# Patient Record
Sex: MALE | Race: WHITE | NOT HISPANIC OR LATINO | Employment: FULL TIME | URBAN - NONMETROPOLITAN AREA
[De-identification: names, ages, dates, MRNs, and addresses within clinical notes are randomized per-mention and may not be internally consistent; named-entity substitution may affect disease eponyms.]

---

## 2024-02-07 ENCOUNTER — TELEPHONE (OUTPATIENT)
Dept: CARDIOLOGY | Facility: CLINIC | Age: 49
End: 2024-02-07
Payer: COMMERCIAL

## 2024-02-07 NOTE — TELEPHONE ENCOUNTER
EKG at work for basic job information came back showing abnormal. So they made OV with our office. Wife called into office states that it reads    Left axis deviation  Septal infarct    Advised will review at his pending visit and do another to compare. Scheduled on 2/21/2024. Did advise the computer interpretation is not always correct so will address at visit

## 2024-02-21 ENCOUNTER — APPOINTMENT (OUTPATIENT)
Dept: CARDIOLOGY | Facility: CLINIC | Age: 49
End: 2024-02-21
Payer: COMMERCIAL

## 2024-03-01 ENCOUNTER — OFFICE VISIT (OUTPATIENT)
Dept: CARDIOLOGY | Facility: CLINIC | Age: 49
End: 2024-03-01
Payer: COMMERCIAL

## 2024-03-01 VITALS
WEIGHT: 164 LBS | HEIGHT: 67 IN | HEART RATE: 44 BPM | DIASTOLIC BLOOD PRESSURE: 76 MMHG | SYSTOLIC BLOOD PRESSURE: 120 MMHG | BODY MASS INDEX: 25.74 KG/M2

## 2024-03-01 DIAGNOSIS — R94.31 ABNORMAL EKG: ICD-10-CM

## 2024-03-01 DIAGNOSIS — R56.9 SEIZURE (MULTI): ICD-10-CM

## 2024-03-01 DIAGNOSIS — R00.1 BRADYCARDIA: ICD-10-CM

## 2024-03-01 DIAGNOSIS — F17.200 CURRENT SMOKER: Primary | ICD-10-CM

## 2024-03-01 DIAGNOSIS — E78.2 MIXED HYPERLIPIDEMIA: ICD-10-CM

## 2024-03-01 PROBLEM — E78.5 HYPERLIPIDEMIA: Status: ACTIVE | Noted: 2024-03-01

## 2024-03-01 PROCEDURE — 3008F BODY MASS INDEX DOCD: CPT | Performed by: INTERNAL MEDICINE

## 2024-03-01 PROCEDURE — 93000 ELECTROCARDIOGRAM COMPLETE: CPT | Performed by: INTERNAL MEDICINE

## 2024-03-01 PROCEDURE — 99204 OFFICE O/P NEW MOD 45 MIN: CPT | Performed by: INTERNAL MEDICINE

## 2024-03-01 RX ORDER — TIZANIDINE 4 MG/1
4 TABLET ORAL EVERY 6 HOURS PRN
COMMUNITY

## 2024-03-01 RX ORDER — TRAZODONE HYDROCHLORIDE 150 MG/1
300 TABLET ORAL NIGHTLY
COMMUNITY

## 2024-03-01 RX ORDER — GABAPENTIN 300 MG/1
300 CAPSULE ORAL 2 TIMES DAILY
COMMUNITY

## 2024-03-01 RX ORDER — DICLOFENAC SODIUM 100 MG/1
100 TABLET, EXTENDED RELEASE ORAL DAILY
COMMUNITY

## 2024-03-01 RX ORDER — PHENOBARBITAL 64.8 MG/1
64.8 TABLET ORAL 2 TIMES DAILY
COMMUNITY

## 2024-03-01 RX ORDER — SIMVASTATIN 5 MG/1
5 TABLET, FILM COATED ORAL NIGHTLY
COMMUNITY
Start: 2024-02-16

## 2024-03-01 RX ORDER — ACETAMINOPHEN 500 MG
500 TABLET ORAL 2 TIMES DAILY
COMMUNITY

## 2024-03-01 RX ORDER — TOPIRAMATE 50 MG/1
50 TABLET, FILM COATED ORAL DAILY
COMMUNITY

## 2024-03-01 NOTE — LETTER
"March 1, 2024     Maynor Kathleen  [address] [place] [id number]    Patient: Maynor Kathleen   YOB: 1975   Date of Visit: 3/1/2024       Dear Dr. Maynor Kathleen:    Thank you for referring Maynor Kathleen to me for evaluation. Below are my notes for this consultation.  If you have questions, please do not hesitate to call me. I look forward to following your patient along with you.       Sincerely,     Nelli Stein MD      CC: No Recipients  ______________________________________________________________________________________    Cardiology Consultation- New Consult    Reason for referral: Abnormal EKG    HPI: Maynor Kathleen is a 48 y.o. male with no prior cardiac history.  He self scheduled this appointment because of abnormal \"cardiac scan\".  The patient apparently had an EKG at work.  He was told it was abnormal.  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  He denies any complaint of chest pain, palpitation, lightheadedness, dizziness or syncope.  His EKG showed sinus bradycardia with incomplete right bundle branch block.  Some features of Brugada syndrome are seen in lead V1 and V2.  Patient denies any lightheadedness, dizziness or syncope.  There is no family history of sudden cardiac death.  He report history of seizure and he is on phenobarbital.    Assessment    1.  Excessive resting sinus bradycardia with heart rate of 46  2.  Borderline abnormal EKG showing some features of Brugada syndrome however the patient has no palpitation, no lightheadedness, no dizziness, no syncope and no family history of sudden cardiac death  3.  Chronic back pain  4.  History of seizure control none over the last 20 years  5.  Tobacco use    Plan    1.  I recommended the patient to have a 40-hour Holter monitor  2.  I recommend checking TSH  3.  I recommended GXT to confirm normal chronotropic response to exercise and no provoked arrhythmia.  4.  Lengthy discussion " "with the family regarding prognosis, diagnosis and workup option reviewed with the patient his son and his wife  5.  Patient was counseled regarding smoking cessation      Past Medical History:   He has no past medical history on file.    Surgical History:   He has a past surgical history that includes Colonoscopy; Hand surgery; and Vasectomy.    Family History:   Family History   Problem Relation Name Age of Onset   • No Known Problems Mother     • Clotting disorder Father     • No Known Problems Sister     • No Known Problems Brother         Social History:   Social History     Tobacco Use   • Smoking status: Every Day     Types: Cigarettes   • Smokeless tobacco: Current   Substance Use Topics   • Alcohol use: Yes        Allergies:  Beeswax     Current Medications:    Current Outpatient Medications:   •  acetaminophen (Tylenol) 500 mg tablet, Take 1 tablet (500 mg) by mouth 2 times a day., Disp: , Rfl:   •  diclofenac sodium (Voltaren XR) 100 mg 24 hr tablet, Take 1 tablet (100 mg) by mouth once daily. Take with food., Disp: , Rfl:   •  gabapentin (Neurontin) 300 mg capsule, Take 1 capsule (300 mg) by mouth 2 times a day., Disp: , Rfl:   •  PHENobarbitaL 64.8 mg tablet, Take 1 tablet (64.8 mg) by mouth at 3:00 in the morning., Disp: , Rfl:   •  simvastatin (Zocor) 5 mg tablet, Take 1 tablet (5 mg) by mouth once daily at bedtime., Disp: , Rfl:   •  tiZANidine (Zanaflex) 4 mg tablet, Take 1 tablet (4 mg) by mouth every 6 hours if needed., Disp: , Rfl:   •  topiramate (Topamax) 50 mg tablet, Take 1 tablet (50 mg) by mouth once daily., Disp: , Rfl:   •  traZODone (Desyrel) 150 mg tablet, Take 2 tablets (300 mg) by mouth once daily at bedtime., Disp: , Rfl:      Vitals:  Vitals:    03/01/24 1006 03/01/24 1007   BP: 128/78 120/76   BP Location: Right arm Left arm   Patient Position: Sitting Sitting   Pulse: (!) 44 (!) 44   Weight: 74.4 kg (164 lb)    Height: 1.702 m (5' 7\")           ROS    Objective        Physical " Exam  Constitutional:       Appearance: Normal appearance.   HENT:      Nose: Nose normal.   Neck:      Vascular: No carotid bruit.   Cardiovascular:      Rate and Rhythm: Normal rate.      Pulses: Normal pulses.      Heart sounds: Normal heart sounds.   Pulmonary:      Effort: Pulmonary effort is normal.   Abdominal:      General: Bowel sounds are normal.      Palpations: Abdomen is soft.   Musculoskeletal:         General: Normal range of motion.      Cervical back: Normal range of motion.      Right lower leg: No edema.      Left lower leg: No edema.   Skin:     General: Skin is warm and dry.   Neurological:      General: No focal deficit present.      Mental Status: He is alert.   Psychiatric:         Mood and Affect: Mood normal.         Behavior: Behavior normal.         Thought Content: Thought content normal.         Judgment: Judgment normal.              EKG done in office today   Assessment and Plan:   1. Current smoker        2. Abnormal EKG  ECG 12 Lead    Stress Test    Thyroid Stimulating Hormone    Thyroid Stimulating Hormone      3. Bradycardia  Follow Up In Cardiology    ECG 12 Lead    Stress Test    Holter Or Event Cardiac Monitor    Thyroid Stimulating Hormone    Thyroid Stimulating Hormone      4. Mixed hyperlipidemia        5. BMI 25.0-25.9,adult        6. Seizure (CMS/HCC)               Scribe Attestation  By signing my name below, Imarticlaraceli , Scribe   attest that this documentation has been prepared under the direction and in the presence of Nelli Stein MD.    Provider Attestation - Scribe documentation    All medical record entries made by the Scribe were at my direction and personally dictated by me. I have reviewed the chart and agree that the record accurately reflects my personal performance of the history, physical exam, discussion and plan.

## 2024-03-01 NOTE — PATIENT INSTRUCTIONS
Please bring all medicines, vitamins, and herbal supplements with you when you come to the office.    Prescriptions will not be filled unless you are compliant with your follow up appointments or have a follow up appointment scheduled as per instruction of your physician. Refills should be requested at the time of your visit.     BMI was above normal measurement. Current weight: 74.4 kg (164 lb)  Weight change since last visit (-) denotes wt loss 164 lbs   Weight loss needed to achieve BMI 25: 4.7 Lbs  Weight loss needed to achieve BMI 30: -27.1 Lbs  Provided instructions on dietary changes  Provided instructions on exercise.    48 hour holter  GXT  TSH

## 2024-03-01 NOTE — PROGRESS NOTES
"Cardiology Consultation- New Consult    Reason for referral: Abnormal EKG    HPI: Maynor Kathleen is a 48 y.o. male with no prior cardiac history.  He self scheduled this appointment because of abnormal \"cardiac scan\".  The patient apparently had an EKG at work.  He was told it was abnormal.  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  He denies any complaint of chest pain, palpitation, lightheadedness, dizziness or syncope.  His EKG showed sinus bradycardia with incomplete right bundle branch block.  Some features of Brugada syndrome are seen in lead V1 and V2.  Patient denies any lightheadedness, dizziness or syncope.  There is no family history of sudden cardiac death.  He report history of seizure and he is on phenobarbital.    Assessment    1.  Excessive resting sinus bradycardia with heart rate of 46  2.  Borderline abnormal EKG showing some features of Brugada syndrome however the patient has no palpitation, no lightheadedness, no dizziness, no syncope and no family history of sudden cardiac death  3.  Chronic back pain  4.  History of seizure control none over the last 20 years  5.  Tobacco use    Plan    1.  I recommended the patient to have a 40-hour Holter monitor  2.  I recommend checking TSH  3.  I recommended GXT to confirm normal chronotropic response to exercise and no provoked arrhythmia.  4.  Lengthy discussion with the family regarding prognosis, diagnosis and workup option reviewed with the patient his son and his wife  5.  Patient was counseled regarding smoking cessation      Past Medical History:   He has no past medical history on file.    Surgical History:   He has a past surgical history that includes Colonoscopy; Hand surgery; and Vasectomy.    Family History:   Family History   Problem Relation Name Age of Onset    No Known Problems Mother      Clotting disorder Father      No Known Problems Sister      No Known Problems Brother         Social History:   Social " "History     Tobacco Use    Smoking status: Every Day     Types: Cigarettes    Smokeless tobacco: Current   Substance Use Topics    Alcohol use: Yes        Allergies:  Beeswax     Current Medications:    Current Outpatient Medications:     acetaminophen (Tylenol) 500 mg tablet, Take 1 tablet (500 mg) by mouth 2 times a day., Disp: , Rfl:     diclofenac sodium (Voltaren XR) 100 mg 24 hr tablet, Take 1 tablet (100 mg) by mouth once daily. Take with food., Disp: , Rfl:     gabapentin (Neurontin) 300 mg capsule, Take 1 capsule (300 mg) by mouth 2 times a day., Disp: , Rfl:     PHENobarbitaL 64.8 mg tablet, Take 1 tablet (64.8 mg) by mouth at 3:00 in the morning., Disp: , Rfl:     simvastatin (Zocor) 5 mg tablet, Take 1 tablet (5 mg) by mouth once daily at bedtime., Disp: , Rfl:     tiZANidine (Zanaflex) 4 mg tablet, Take 1 tablet (4 mg) by mouth every 6 hours if needed., Disp: , Rfl:     topiramate (Topamax) 50 mg tablet, Take 1 tablet (50 mg) by mouth once daily., Disp: , Rfl:     traZODone (Desyrel) 150 mg tablet, Take 2 tablets (300 mg) by mouth once daily at bedtime., Disp: , Rfl:      Vitals:  Vitals:    03/01/24 1006 03/01/24 1007   BP: 128/78 120/76   BP Location: Right arm Left arm   Patient Position: Sitting Sitting   Pulse: (!) 44 (!) 44   Weight: 74.4 kg (164 lb)    Height: 1.702 m (5' 7\")           ROS    Objective         Physical Exam  Constitutional:       Appearance: Normal appearance.   HENT:      Nose: Nose normal.   Neck:      Vascular: No carotid bruit.   Cardiovascular:      Rate and Rhythm: Normal rate.      Pulses: Normal pulses.      Heart sounds: Normal heart sounds.   Pulmonary:      Effort: Pulmonary effort is normal.   Abdominal:      General: Bowel sounds are normal.      Palpations: Abdomen is soft.   Musculoskeletal:         General: Normal range of motion.      Cervical back: Normal range of motion.      Right lower leg: No edema.      Left lower leg: No edema.   Skin:     General: Skin is " warm and dry.   Neurological:      General: No focal deficit present.      Mental Status: He is alert.   Psychiatric:         Mood and Affect: Mood normal.         Behavior: Behavior normal.         Thought Content: Thought content normal.         Judgment: Judgment normal.              EKG done in office today   Assessment and Plan:   1. Current smoker        2. Abnormal EKG  ECG 12 Lead    Stress Test    Thyroid Stimulating Hormone    Thyroid Stimulating Hormone      3. Bradycardia  Follow Up In Cardiology    ECG 12 Lead    Stress Test    Holter Or Event Cardiac Monitor    Thyroid Stimulating Hormone    Thyroid Stimulating Hormone      4. Mixed hyperlipidemia        5. BMI 25.0-25.9,adult        6. Seizure (CMS/HCC)               Scribe Attestation  By signing my name below, Iangela Scrtoyin   attest that this documentation has been prepared under the direction and in the presence of Nelli Stein MD.    Provider Attestation - Scribe documentation    All medical record entries made by the Scribe were at my direction and personally dictated by me. I have reviewed the chart and agree that the record accurately reflects my personal performance of the history, physical exam, discussion and plan.

## 2024-03-13 ENCOUNTER — ANCILLARY PROCEDURE (OUTPATIENT)
Dept: CARDIOLOGY | Facility: CLINIC | Age: 49
End: 2024-03-13
Payer: COMMERCIAL

## 2024-03-13 ENCOUNTER — HOSPITAL ENCOUNTER (OUTPATIENT)
Dept: CARDIOLOGY | Facility: CLINIC | Age: 49
Discharge: HOME | End: 2024-03-13
Payer: COMMERCIAL

## 2024-03-13 VITALS — HEART RATE: 44 BPM | DIASTOLIC BLOOD PRESSURE: 76 MMHG | SYSTOLIC BLOOD PRESSURE: 120 MMHG

## 2024-03-13 DIAGNOSIS — R00.1 BRADYCARDIA: ICD-10-CM

## 2024-03-13 DIAGNOSIS — R94.31 ABNORMAL EKG: ICD-10-CM

## 2024-03-13 PROCEDURE — 93017 CV STRESS TEST TRACING ONLY: CPT

## 2024-03-13 PROCEDURE — 93018 CV STRESS TEST I&R ONLY: CPT | Performed by: INTERNAL MEDICINE

## 2024-03-13 PROCEDURE — 93227 XTRNL ECG REC<48 HR R&I: CPT | Performed by: INTERNAL MEDICINE

## 2024-03-13 PROCEDURE — 93016 CV STRESS TEST SUPVJ ONLY: CPT | Performed by: INTERNAL MEDICINE

## 2024-03-13 PROCEDURE — 93225 XTRNL ECG REC<48 HRS REC: CPT | Performed by: INTERNAL MEDICINE

## 2024-05-24 ENCOUNTER — OFFICE VISIT (OUTPATIENT)
Dept: CARDIOLOGY | Facility: CLINIC | Age: 49
End: 2024-05-24
Payer: COMMERCIAL

## 2024-05-24 VITALS — DIASTOLIC BLOOD PRESSURE: 66 MMHG | SYSTOLIC BLOOD PRESSURE: 100 MMHG | HEART RATE: 64 BPM

## 2024-05-24 DIAGNOSIS — R00.1 BRADYCARDIA: ICD-10-CM

## 2024-05-24 DIAGNOSIS — E78.2 MIXED HYPERLIPIDEMIA: ICD-10-CM

## 2024-05-24 DIAGNOSIS — R56.9 SEIZURE (MULTI): ICD-10-CM

## 2024-05-24 DIAGNOSIS — R94.31 ABNORMAL EKG: Primary | ICD-10-CM

## 2024-05-24 DIAGNOSIS — F17.200 CURRENT SMOKER: ICD-10-CM

## 2024-05-24 PROCEDURE — 99214 OFFICE O/P EST MOD 30 MIN: CPT | Performed by: INTERNAL MEDICINE

## 2024-05-24 PROCEDURE — 4004F PT TOBACCO SCREEN RCVD TLK: CPT | Performed by: INTERNAL MEDICINE

## 2024-05-24 PROCEDURE — 3008F BODY MASS INDEX DOCD: CPT | Performed by: INTERNAL MEDICINE

## 2024-05-24 NOTE — PROGRESS NOTES
Subjective   Maynor Kathleen is a 48 y.o. male       Chief Complaint    Follow-up          HPI   Patient is here for follow-up continue management for recent evaluation for bradycardia and borderline abnormal EKG.  Since last time I saw him he denies any complaint.  His recent workup including a GXT which showed good exercise tolerance and physiologic chronotropic response to exercise.  His Holter monitor failed to demonstrate arrhythmia and to indicate average heart rate around 67.  Assessment     1.  Excessive resting sinus bradycardia with heart rate of 46 noted intermittently but with good exercise tolerance and chronotropic response to exercise.  Average heart rate was 67 per Holter monitor  2.  Borderline abnormal EKG showing some features of Brugada syndrome however the patient has no palpitation, no lightheadedness, no dizziness, no syncope and no family history of sudden cardiac death.  Will continue to observe  3.  Chronic back pain  4.  History of seizure control none over the last 20 years  5.  Tobacco use  6.  Hyperlipidemia on treatment     Plan     1.  I reviewed the result of his diagnostic testing  2.  Patient was advised to do his TSH but he did not do that  3.  I commended observant approach  4.  Patient was counseled regarding smoking cessation  Review of Systems   All other systems reviewed and are negative.           Vitals:    05/24/24 1127   BP: 100/66   BP Location: Right arm   Patient Position: Sitting   Pulse: 64        Objective   Physical Exam    Allergies  Beeswax     Current Medications    Current Outpatient Medications:     acetaminophen (Tylenol) 500 mg tablet, Take 1 tablet (500 mg) by mouth 2 times a day., Disp: , Rfl:     diclofenac sodium (Voltaren XR) 100 mg 24 hr tablet, Take 1 tablet (100 mg) by mouth once daily. Take with food., Disp: , Rfl:     gabapentin (Neurontin) 300 mg capsule, Take 1 capsule (300 mg) by mouth 2 times a day., Disp: , Rfl:     PHENobarbitaL 64.8 mg  tablet, Take 1 tablet (64.8 mg) by mouth 2 times a day., Disp: , Rfl:     simvastatin (Zocor) 5 mg tablet, Take 1 tablet (5 mg) by mouth once daily at bedtime., Disp: , Rfl:     tiZANidine (Zanaflex) 4 mg tablet, Take 1 tablet (4 mg) by mouth every 6 hours if needed., Disp: , Rfl:     traZODone (Desyrel) 150 mg tablet, Take 2 tablets (300 mg) by mouth once daily at bedtime., Disp: , Rfl:     topiramate (Topamax) 50 mg tablet, Take 1 tablet (50 mg) by mouth once daily., Disp: , Rfl:                      Assessment/Plan   1. Abnormal EKG        2. Bradycardia  Follow Up In Cardiology    Follow Up In Cardiology      3. Current smoker        4. BMI 25.0-25.9,adult        5. Mixed hyperlipidemia        6. Seizure (Multi)                 Scribe Attestation  By signing my name below, I, .Mariana ruiz   attest that this documentation has been prepared under the direction and in the presence of Nelli Stein MD.     Provider Attestation - Scribe documentation    All medical record entries made by the Scribe were at my direction and personally dictated by me. I have reviewed the chart and agree that the record accurately reflects my personal performance of the history, physical exam, discussion and plan.

## 2024-05-24 NOTE — PATIENT INSTRUCTIONS
Please bring all medicines, vitamins, and herbal supplements with you when you come to the office.    Prescriptions will not be filled unless you are compliant with your follow up appointments or have a follow up appointment scheduled as per instruction of your physician. Refills should be requested at the time of your visit.     BMI was above normal measurement. Current weight:    Weight change since last visit (-) denotes wt loss     Weight loss needed to achieve BMI 25:   Lbs  Weight loss needed to achieve BMI 30:   Lbs  Provided instructions on dietary changes.

## 2024-11-08 ENCOUNTER — APPOINTMENT (OUTPATIENT)
Dept: CARDIOLOGY | Facility: CLINIC | Age: 49
End: 2024-11-08
Payer: COMMERCIAL

## 2025-05-22 ENCOUNTER — APPOINTMENT (OUTPATIENT)
Dept: CARDIOLOGY | Facility: CLINIC | Age: 50
End: 2025-05-22
Payer: COMMERCIAL